# Patient Record
Sex: MALE | Race: WHITE | NOT HISPANIC OR LATINO | URBAN - NONMETROPOLITAN AREA
[De-identification: names, ages, dates, MRNs, and addresses within clinical notes are randomized per-mention and may not be internally consistent; named-entity substitution may affect disease eponyms.]

---

## 2020-02-05 ENCOUNTER — IMPORTED ENCOUNTER (OUTPATIENT)
Dept: URBAN - NONMETROPOLITAN AREA CLINIC 1 | Facility: CLINIC | Age: 60
End: 2020-02-05

## 2020-02-05 PROBLEM — H52.223: Noted: 2020-02-05

## 2020-02-05 PROBLEM — H52.03: Noted: 2020-02-05

## 2020-02-05 PROBLEM — H52.4: Noted: 2020-02-05

## 2020-02-05 PROBLEM — H43.813: Noted: 2020-02-05

## 2020-02-05 PROCEDURE — 99214 OFFICE O/P EST MOD 30 MIN: CPT

## 2021-03-22 ENCOUNTER — IMPORTED ENCOUNTER (OUTPATIENT)
Dept: URBAN - NONMETROPOLITAN AREA CLINIC 1 | Facility: CLINIC | Age: 61
End: 2021-03-22

## 2021-03-22 PROCEDURE — 92014 COMPRE OPH EXAM EST PT 1/>: CPT

## 2021-03-22 PROCEDURE — 92015 DETERMINE REFRACTIVE STATE: CPT

## 2021-03-22 NOTE — PATIENT DISCUSSION
PVD ou-Retina flat 360 with no breaks tears or heme.-S&S of RD/RT reviewed with pt.-Stressed that pt should contact our office right away with any changes or increase in symptoms. Hyperopia-Discussed diagnosis with patient. Presbyopia-Discussed diagnosis with patient. Astigmatism-Discussed diagnosis with patient. Updated spec Rx given. Recommend lens that will provide comfort as well as protect safety and health of eyes.

## 2022-04-10 ASSESSMENT — VISUAL ACUITY
OD_SC: 20/30-2
OD_SC: 20/20-1
OS_SC: 20/30
OS_SC: 20/25-1

## 2022-04-10 ASSESSMENT — TONOMETRY
OD_IOP_MMHG: 12
OD_IOP_MMHG: 12
OS_IOP_MMHG: 12
OS_IOP_MMHG: 12

## 2023-01-31 RX ORDER — OMEPRAZOLE 20 MG/1
20 CAPSULE, DELAYED RELEASE ORAL
COMMUNITY
Start: 2016-05-03

## 2023-01-31 RX ORDER — CRANBERRY FRUIT EXTRACT 200 MG
600 CAPSULE ORAL
COMMUNITY

## 2023-01-31 RX ORDER — CYCLOBENZAPRINE HCL 5 MG
5 TABLET ORAL 3 TIMES DAILY PRN
COMMUNITY
Start: 2022-06-21

## 2023-01-31 RX ORDER — IBUPROFEN 600 MG/1
600 TABLET ORAL EVERY 6 HOURS PRN
COMMUNITY
Start: 2018-10-28

## 2023-01-31 RX ORDER — LISINOPRIL AND HYDROCHLOROTHIAZIDE 12.5; 1 MG/1; MG/1
TABLET ORAL
COMMUNITY
Start: 2016-05-03

## 2023-01-31 RX ORDER — LIDOCAINE 50 MG/G
PATCH TOPICAL
COMMUNITY
Start: 2022-06-21

## 2023-01-31 RX ORDER — METHOCARBAMOL 500 MG/1
500 TABLET, FILM COATED ORAL 4 TIMES DAILY
COMMUNITY
Start: 2018-10-28

## 2023-01-31 RX ORDER — UBIDECARENONE 100 MG
100 CAPSULE ORAL DAILY
COMMUNITY

## 2023-01-31 RX ORDER — METHYLPREDNISOLONE 4 MG/1
TABLET ORAL
COMMUNITY
Start: 2022-06-22

## 2024-06-26 ENCOUNTER — NEW PATIENT (OUTPATIENT)
Dept: RURAL CLINIC 1 | Facility: CLINIC | Age: 64
End: 2024-06-26

## 2024-06-26 DIAGNOSIS — H52.223: ICD-10-CM

## 2024-06-26 DIAGNOSIS — H52.03: ICD-10-CM

## 2024-06-26 DIAGNOSIS — H43.813: ICD-10-CM

## 2024-06-26 DIAGNOSIS — H52.4: ICD-10-CM

## 2024-06-26 PROCEDURE — 92015 DETERMINE REFRACTIVE STATE: CPT

## 2024-06-26 PROCEDURE — 92004 COMPRE OPH EXAM NEW PT 1/>: CPT

## 2024-06-26 ASSESSMENT — VISUAL ACUITY
OS_CC: 20/20
OD_CC: 20/20
OS_CC: 20/30
OD_CC: 20/30-2

## 2024-06-26 ASSESSMENT — TONOMETRY
OD_IOP_MMHG: 12
OS_IOP_MMHG: 12